# Patient Record
Sex: MALE | Race: WHITE | NOT HISPANIC OR LATINO | Employment: FULL TIME | URBAN - METROPOLITAN AREA
[De-identification: names, ages, dates, MRNs, and addresses within clinical notes are randomized per-mention and may not be internally consistent; named-entity substitution may affect disease eponyms.]

---

## 2021-08-17 ENCOUNTER — OFFICE VISIT (OUTPATIENT)
Dept: FAMILY MEDICINE CLINIC | Facility: CLINIC | Age: 26
End: 2021-08-17

## 2021-08-17 VITALS
BODY MASS INDEX: 34.3 KG/M2 | HEIGHT: 66 IN | OXYGEN SATURATION: 97 % | TEMPERATURE: 97.5 F | DIASTOLIC BLOOD PRESSURE: 73 MMHG | SYSTOLIC BLOOD PRESSURE: 115 MMHG | WEIGHT: 213.4 LBS | HEART RATE: 62 BPM

## 2021-08-17 DIAGNOSIS — F41.9 ANXIETY: ICD-10-CM

## 2021-08-17 DIAGNOSIS — G44.209 ACUTE NON INTRACTABLE TENSION-TYPE HEADACHE: ICD-10-CM

## 2021-08-17 DIAGNOSIS — R41.840 DECREASED ATTENTION SPAN: ICD-10-CM

## 2021-08-17 DIAGNOSIS — F32.1 CURRENT MODERATE EPISODE OF MAJOR DEPRESSIVE DISORDER, UNSPECIFIED WHETHER RECURRENT (HCC): ICD-10-CM

## 2021-08-17 DIAGNOSIS — Z00.00 HEALTH MAINTENANCE EXAMINATION: Primary | ICD-10-CM

## 2021-08-17 PROCEDURE — 99385 PREV VISIT NEW AGE 18-39: CPT | Performed by: NURSE PRACTITIONER

## 2021-08-17 RX ORDER — BUPROPION HYDROCHLORIDE 150 MG/1
150 TABLET ORAL DAILY
Qty: 90 TABLET | Refills: 0 | Status: SHIPPED | OUTPATIENT
Start: 2021-08-17 | End: 2021-09-21 | Stop reason: SDUPTHER

## 2021-08-17 NOTE — PROGRESS NOTES
Subjective   Herbert Bruce is a 26 y.o. male.     Chief Complaint   Patient presents with   • Annual Exam   • Headache     bottom of skull   • discuss attention disorder     This is my first time seeing this patient.  Headache   This is a recurrent problem. The pain is located in the occipital region. The pain does not radiate. The quality of the pain is described as dull. Associated symptoms include phonophobia. Pertinent negatives include no abdominal pain, coughing, dizziness, ear pain, fever, nausea, photophobia, rhinorrhea, sore throat, visual change or vomiting. Exacerbated by: caffeine, anxiety and long distance driving. He has tried nothing for the symptoms.      The patient is being seen for a health maintenance evaluation.  The last health maintenance visit was about 2 years ago.  Social history: Household members include sister and sister-in-law.  He is unmarried.  Work status: Full-time.  The patient is an occasional smoker.  He reports 1-2 alcoholic beverages per day.  General health: The patient's health is described as good.  He does not have regular dental visits.  The patient brushes daily, flosses occasionally and reports his last dental visit is unknown.  He denies vision problems. Vision care includes no need for vision correction and no recent eye examination. He denies hearing loss. Immunization status: Tdap vaccination needed.   Lifestyle: He exercises regularly.   Reproductive health: He is not sexually active.     PHQ-9 Depression Screening  Little interest or pleasure in doing things? 1   Feeling down, depressed, or hopeless? 1   Trouble falling or staying asleep, or sleeping too much? 1   Feeling tired or having little energy? 3   Poor appetite or overeating? 0   Feeling bad about yourself - or that you are a failure or have let yourself or your family down? 3   Trouble concentrating on things, such as reading the newspaper or watching television? 3   Moving or speaking so slowly that  other people could have noticed? Or the opposite - being so fidgety or restless that you have been moving around a lot more than usual? 2   Thoughts that you would be better off dead, or of hurting yourself in some way? 0   PHQ-9 Total Score 14   If you checked off any problems, how difficult have these problems made it for you to do your work, take care of things at home, or get along with other people? Somewhat difficult       The following portions of the patient's history were reviewed and updated as appropriate: allergies, current medications, past family history, past medical history, past social history, past surgical history and problem list.    Past Medical History:   Diagnosis Date   • Seizures (CMS/HCC) 2003       Past Surgical History:   Procedure Laterality Date   • BRAIN SURGERY  2010       History reviewed. No pertinent family history.    Social History     Socioeconomic History   • Marital status: Single     Spouse name: Not on file   • Number of children: Not on file   • Years of education: Not on file   • Highest education level: Not on file   Tobacco Use   • Smoking status: Never Smoker   • Smokeless tobacco: Never Used   Substance and Sexual Activity   • Alcohol use: Yes     Comment: daily   • Drug use: Never       Review of Systems   Constitutional: Negative for fever.   HENT: Negative for ear pain, rhinorrhea and sore throat.    Eyes: Negative for photophobia.   Respiratory: Negative for cough and shortness of breath.    Cardiovascular: Negative for chest pain.   Gastrointestinal: Negative for abdominal pain, diarrhea, nausea and vomiting.   Genitourinary: Negative.    Musculoskeletal: Negative.    Skin: Negative for rash.   Neurological: Positive for headache. Negative for dizziness.   Psychiatric/Behavioral: Positive for depressed mood. Negative for suicidal ideas.       Objective   Vitals:    08/17/21 1319   BP: 115/73   BP Location: Left arm   Patient Position: Sitting   Cuff Size: Adult  "  Pulse: 62   Temp: 97.5 °F (36.4 °C)   TempSrc: Temporal   SpO2: 97%   Weight: 96.8 kg (213 lb 6.4 oz)   Height: 167.6 cm (66\")      Body mass index is 34.44 kg/m².  Physical Exam  Vitals and nursing note reviewed.   Constitutional:       Appearance: Normal appearance.   HENT:      Head: Normocephalic and atraumatic.      Right Ear: Tympanic membrane and ear canal normal.      Left Ear: Tympanic membrane and ear canal normal.   Eyes:      Conjunctiva/sclera: Conjunctivae normal.      Pupils: Pupils are equal, round, and reactive to light.   Cardiovascular:      Rate and Rhythm: Normal rate and regular rhythm.      Heart sounds: Normal heart sounds.   Pulmonary:      Effort: Pulmonary effort is normal.      Breath sounds: Normal breath sounds.   Abdominal:      General: Bowel sounds are normal.      Palpations: Abdomen is soft.      Tenderness: There is no abdominal tenderness.   Genitourinary:     Comments: Declined   Musculoskeletal:         General: Normal range of motion.      Cervical back: Neck supple.   Skin:     General: Skin is warm and dry.   Neurological:      Mental Status: He is alert and oriented to person, place, and time.   Psychiatric:         Mood and Affect: Mood normal.           Assessment/Plan   Diagnoses and all orders for this visit:    1. Health maintenance examination (Primary)  -     Cancel: Tdap Vaccine Greater Than or Equal To 6yo IM    2. Acute non intractable tension-type headache  Comments:  - Advised patient to use OTC NSAID as needed.   - Decrease caffeine use.     3. Current moderate episode of major depressive disorder, unspecified whether recurrent (CMS/[facility])  Comments:  - Risks and benefits of medication discussed with patient.   - ER if any SI/HI.   Orders:  -     buPROPion XL (Wellbutrin XL) 150 MG 24 hr tablet; Take 1 tablet by mouth Daily.  Dispense: 90 tablet; Refill: 0    4. Anxiety  -     buPROPion XL (Wellbutrin XL) 150 MG 24 hr tablet; Take 1 tablet by mouth Daily.  " Dispense: 90 tablet; Refill: 0    5. Decreased attention Span  -     buPROPion XL (Wellbutrin XL) 150 MG 24 hr tablet; Take 1 tablet by mouth Daily.  Dispense: 90 tablet; Refill: 0    Impression: Currently he has an adequate exercise regimen.  Patient declines screening lab work at this time.  Tdap is unavailable in office today.  He was advised to be evaluated by dentist.  Advice and education were given regarding diet.

## 2021-09-21 ENCOUNTER — OFFICE VISIT (OUTPATIENT)
Dept: FAMILY MEDICINE CLINIC | Facility: CLINIC | Age: 26
End: 2021-09-21

## 2021-09-21 VITALS
WEIGHT: 208.4 LBS | HEART RATE: 62 BPM | OXYGEN SATURATION: 97 % | BODY MASS INDEX: 33.49 KG/M2 | SYSTOLIC BLOOD PRESSURE: 105 MMHG | TEMPERATURE: 97.5 F | HEIGHT: 66 IN | DIASTOLIC BLOOD PRESSURE: 68 MMHG

## 2021-09-21 DIAGNOSIS — R41.840 DECREASED ATTENTION SPAN: ICD-10-CM

## 2021-09-21 DIAGNOSIS — F41.9 ANXIETY: ICD-10-CM

## 2021-09-21 DIAGNOSIS — F32.1 CURRENT MODERATE EPISODE OF MAJOR DEPRESSIVE DISORDER, UNSPECIFIED WHETHER RECURRENT (HCC): ICD-10-CM

## 2021-09-21 PROCEDURE — 99213 OFFICE O/P EST LOW 20 MIN: CPT | Performed by: NURSE PRACTITIONER

## 2021-09-21 RX ORDER — BUPROPION HYDROCHLORIDE 300 MG/1
300 TABLET ORAL DAILY
Qty: 90 TABLET | Refills: 0 | Status: SHIPPED | OUTPATIENT
Start: 2021-09-21 | End: 2021-10-20 | Stop reason: SDUPTHER

## 2021-09-21 NOTE — PROGRESS NOTES
"Chief Complaint  Anxiety and ADD    Subjective          Herbert Bruce presents to [facility]  History of Present Illness  Depression: Patient states their depression has remained stable since the last visit. Interval symptoms: depressed mood, difficulty concentrating and anxiety. Social support: The patient has good social support. The patient is adherent with their medication regimen. Medication(s): bupropion.   Objective   Vital Signs:   /68 (BP Location: Left arm, Patient Position: Sitting, Cuff Size: Adult)   Pulse 62   Temp 97.5 °F (36.4 °C) (Temporal)   Ht 167.6 cm (66\")   Wt 94.5 kg (208 lb 6.4 oz)   SpO2 97%   BMI 33.64 kg/m²     Physical Exam  Vitals and nursing note reviewed.   Constitutional:       Appearance: Normal appearance.   Cardiovascular:      Rate and Rhythm: Normal rate and regular rhythm.      Heart sounds: Normal heart sounds.   Pulmonary:      Effort: Pulmonary effort is normal.      Breath sounds: Normal breath sounds.   Neurological:      Mental Status: He is alert and oriented to person, place, and time.   Psychiatric:         Mood and Affect: Mood normal.        Result Review :            Assessment and Plan    Diagnoses and all orders for this visit:    1. Current moderate episode of major depressive disorder, unspecified whether recurrent (CMS/[facility])  Comments:  - Will increase bupropion to 300 mg daily.   - ER if any SI/HI.   Orders:  -     buPROPion XL (Wellbutrin XL) 300 MG 24 hr tablet; Take 1 tablet by mouth Daily.  Dispense: 90 tablet; Refill: 0    2. Anxiety  -     buPROPion XL (Wellbutrin XL) 300 MG 24 hr tablet; Take 1 tablet by mouth Daily.  Dispense: 90 tablet; Refill: 0    3. Decreased attention Span  -     buPROPion XL (Wellbutrin XL) 300 MG 24 hr tablet; Take 1 tablet by mouth Daily.  Dispense: 90 tablet; Refill: 0      I spent 20 minutes caring for Herbert on this date of service. This time includes time spent by me in the following " activities:performing a medically appropriate examination and/or evaluation , counseling and educating the patient/family/caregiver, ordering medications, tests, or procedures and documenting information in the medical record  Follow Up   Return in about 4 weeks (around 10/19/2021) for Recheck.  Patient was given instructions and counseling regarding his condition or for health maintenance advice. Please see specific information pulled into the AVS if appropriate.

## 2021-10-20 ENCOUNTER — OFFICE VISIT (OUTPATIENT)
Dept: FAMILY MEDICINE CLINIC | Facility: CLINIC | Age: 26
End: 2021-10-20

## 2021-10-20 VITALS
SYSTOLIC BLOOD PRESSURE: 124 MMHG | HEIGHT: 66 IN | DIASTOLIC BLOOD PRESSURE: 80 MMHG | OXYGEN SATURATION: 97 % | BODY MASS INDEX: 33.14 KG/M2 | WEIGHT: 206.2 LBS | HEART RATE: 55 BPM | TEMPERATURE: 98.6 F

## 2021-10-20 DIAGNOSIS — F41.9 ANXIETY: ICD-10-CM

## 2021-10-20 DIAGNOSIS — Z23 NEED FOR INFLUENZA VACCINATION: ICD-10-CM

## 2021-10-20 DIAGNOSIS — R41.840 DECREASED ATTENTION SPAN: ICD-10-CM

## 2021-10-20 DIAGNOSIS — F32.1 CURRENT MODERATE EPISODE OF MAJOR DEPRESSIVE DISORDER, UNSPECIFIED WHETHER RECURRENT (HCC): Primary | ICD-10-CM

## 2021-10-20 PROCEDURE — 99214 OFFICE O/P EST MOD 30 MIN: CPT | Performed by: NURSE PRACTITIONER

## 2021-10-20 PROCEDURE — 90686 IIV4 VACC NO PRSV 0.5 ML IM: CPT | Performed by: NURSE PRACTITIONER

## 2021-10-20 PROCEDURE — 90471 IMMUNIZATION ADMIN: CPT | Performed by: NURSE PRACTITIONER

## 2021-10-20 RX ORDER — BUPROPION HYDROCHLORIDE 150 MG/1
150 TABLET ORAL DAILY
Qty: 90 TABLET | Refills: 0 | Status: SHIPPED | OUTPATIENT
Start: 2021-10-20

## 2021-10-20 NOTE — PROGRESS NOTES
"Chief Complaint  Anxiety    Subjective          Herbert Bruce presents to [facility]  History of Present Illness  Depression: Patient states their depression has worsened since the last visit. Interval symptoms: depressed mood and anxiety. Social support: The patient has good social support. The patient is adherent with their medication regimen. Medication(s): bupropion. Patient reports depression and anxiety has worsened since increasing bupropion.   Objective   Vital Signs:   /80 (BP Location: Right arm, Patient Position: Sitting)   Pulse 55   Temp 98.6 °F (37 °C)   Ht 167.6 cm (65.98\")   Wt 93.5 kg (206 lb 3.2 oz)   SpO2 97%   BMI 33.30 kg/m²     Physical Exam  Vitals and nursing note reviewed.   Constitutional:       Appearance: Normal appearance.   Cardiovascular:      Rate and Rhythm: Normal rate and regular rhythm.      Heart sounds: Normal heart sounds.   Pulmonary:      Effort: Pulmonary effort is normal.      Breath sounds: Normal breath sounds.   Neurological:      Mental Status: He is alert and oriented to person, place, and time.   Psychiatric:         Mood and Affect: Mood normal.        Result Review :            Assessment and Plan    Diagnoses and all orders for this visit:    1. Current moderate episode of major depressive disorder, unspecified whether recurrent ([facility]) (Primary)  Comments:  - Will decrease bupropion to 150 mg daily.   - Risks and benefits of sertraline discussed with patient.   - ER if any SI/HI.   Orders:  -     buPROPion XL (Wellbutrin XL) 150 MG 24 hr tablet; Take 1 tablet by mouth Daily.  Dispense: 90 tablet; Refill: 0  -     sertraline (Zoloft) 50 MG tablet; TAKE 1/2 TABLET DAILY X 1 WEEK, THEN TAKE 1 TABLET DAILY  Dispense: 90 tablet; Refill: 0    2. Anxiety  -     buPROPion XL (Wellbutrin XL) 150 MG 24 hr tablet; Take 1 tablet by mouth Daily.  Dispense: 90 tablet; Refill: 0  -     sertraline (Zoloft) 50 MG tablet; TAKE 1/2 TABLET " DAILY X 1 WEEK, THEN TAKE 1 TABLET DAILY  Dispense: 90 tablet; Refill: 0    3. Decreased attention Span  -     buPROPion XL (Wellbutrin XL) 150 MG 24 hr tablet; Take 1 tablet by mouth Daily.  Dispense: 90 tablet; Refill: 0    4. Need for influenza vaccination  -     FluLaval/Fluarix/Fluzone >6 Months ([id number])      I spent 20 minutes caring for Herbert on this date of service. This time includes time spent by me in the following activities:performing a medically appropriate examination and/or evaluation , counseling and educating the patient/family/caregiver, ordering medications, tests, or procedures and documenting information in the medical record  Follow Up   Return in about 6 weeks (around 12/1/2021) for Recheck.  Patient was given instructions and counseling regarding his condition or for health maintenance advice. Please see specific information pulled into the AVS if appropriate.

## 2021-11-08 ENCOUNTER — TELEPHONE (OUTPATIENT)
Dept: FAMILY MEDICINE CLINIC | Facility: CLINIC | Age: 26
End: 2021-11-08

## 2021-11-08 NOTE — TELEPHONE ENCOUNTER
Caller: Herbert Bruce    Relationship: Self    Best call back number: [phone]    What medications are you currently taking:   Current Outpatient Medications on File Prior to Visit   Medication Sig Dispense Refill   • buPROPion XL (Wellbutrin XL) 150 MG 24 hr tablet Take 1 tablet by mouth Daily. 90 tablet 0   • sertraline (Zoloft) 50 MG tablet TAKE 1/2 TABLET DAILY X 1 WEEK, THEN TAKE 1 TABLET DAILY 90 tablet 0     No current facility-administered medications on file prior to visit.          When did you start taking these medications: 2 WEEKS      Which medication are you concerned about: ZOLOFT     Who prescribed you this medication: GIRMA LEE     What are your concerns: ISSUES WITH PERFORMING SEXUAL ACTIVITIES     How long have you had these concerns: A COUPLE DAYS AFTER BEGINNING MEDICATION

## 2021-11-10 ENCOUNTER — TELEMEDICINE (OUTPATIENT)
Dept: FAMILY MEDICINE CLINIC | Facility: CLINIC | Age: 26
End: 2021-11-10

## 2021-11-10 DIAGNOSIS — F32.1 CURRENT MODERATE EPISODE OF MAJOR DEPRESSIVE DISORDER, UNSPECIFIED WHETHER RECURRENT (HCC): Primary | ICD-10-CM

## 2021-11-10 PROCEDURE — 99212 OFFICE O/P EST SF 10 MIN: CPT | Performed by: NURSE PRACTITIONER

## 2021-11-10 RX ORDER — ESCITALOPRAM OXALATE 10 MG/1
10 TABLET ORAL DAILY
Qty: 30 TABLET | Refills: 1 | Status: SHIPPED | OUTPATIENT
Start: 2021-11-10

## 2021-11-10 NOTE — PROGRESS NOTES
Chief Complaint  Depression  You have chosen to receive care through a telehealth visit.  Do you consent to use a video/audio connection for your medical care today? Yes via [employer]   Subjective          Herbert Bruce presents to [facility]  History of Present Illness  Depression: Patient states their depression has remained stable since the last visit. Social support: The patient has good social support. The patient is adherent with their medication regimen. Medication(s): bupropion and Zoloft. Patient complains of sexual side effects with Zoloft.   Objective   Vital Signs:   There were no vitals taken for this visit.    Physical Exam  Constitutional:       Appearance: Normal appearance.   Neurological:      Mental Status: He is alert.   Psychiatric:         Mood and Affect: Mood normal.        Result Review :            Assessment and Plan    Diagnoses and all orders for this visit:    1. Current moderate episode of major depressive disorder, unspecified whether recurrent (HCC) (Primary)  Comments:  - Risks and benefits of medication discussed with patient.   - ER if any SI/HI.   Orders:  -     escitalopram (Lexapro) 10 MG tablet; Take 1 tablet by mouth Daily.  Dispense: 30 tablet; Refill: 1    Zoloft taper:  - take 1/2 tablet every other day x 1 week then  - discontinue Zoloft and start escitalopram    I spent 15 minutes caring for Herbert on this date of service. This time includes time spent by me in the following activities:counseling and educating the patient/family/caregiver, ordering medications, tests, or procedures and documenting information in the medical record  Follow Up   Return in about 6 weeks (around 12/22/2021) for Recheck.  Patient was given instructions and counseling regarding his condition or for health maintenance advice. Please see specific information pulled into the AVS if appropriate.

## 2025-02-12 ENCOUNTER — TELEMEDICINE (OUTPATIENT)
Dept: FAMILY MEDICINE CLINIC | Facility: TELEHEALTH | Age: 30
End: 2025-02-12
Payer: COMMERCIAL

## 2025-02-12 VITALS — BODY MASS INDEX: 40.18 KG/M2 | WEIGHT: 250 LBS | HEIGHT: 66 IN

## 2025-02-12 DIAGNOSIS — U07.1 COVID-19: Primary | ICD-10-CM

## 2025-02-12 NOTE — PATIENT INSTRUCTIONS
Drink plenty of water  Over the counter pain relievers okay   If symptoms do not improve in 3-5 days follow up with your primary care provider or urgent care  If symptoms worsen follow up with urgent care or the emergency room      Diagnosis  COVID-19 infection  Most people with COVID-19 have mild to moderate symptoms and can rest at home until they get better.   Stay home  While you're recovering, STAY HOME for at least 5 full days. Don't leave your home except to get medical care.  While at home, avoid close contact with others.  If possible, stay in a room away from other people in your home, and use a separate bathroom.  Wear a well-fitting face mask when you can't avoid contact with other people.  If you can't wear a face mask because of breathing difficulty, your caregiver should wear a face mask.  Wearing a face mask does NOT mean you can leave your home. You must continue to stay home for at least 5 full days.  You can return to your normal activities when ALL of the following are true:  You've been fever-free for at least 24 hours (1 full day) without using fever-reducing medications such as Tylenol  Your symptoms have improved  It's been at least 5 full days since your symptoms first started  You should continue to wear a mask around others until it's been at least 10 days since your symptoms first started.  Prevention  Cover your mouth and nose with a tissue when you cough or sneeze. Throw used tissues in a lined trash can right away, and wash your hands immediately after.  Avoid sharing personal items like dishes, utensils, towels, or bedding. Wash items thoroughly with soap and water after use.  Wash your hands often with soap and water for at least 20 seconds. If soap and water are not available, clean your hands with a hand  that contains at least 60% alcohol. Cover all surfaces of your hands and rub them together until they feel dry.  Avoid touching your face, especially your eyes, nose, and  "mouth.  Clean \"high-touch\" surfaces daily. \"High-touch\" surfaces include counters, tabletops, doorknobs, bathroom fixtures, toilets, phones, keyboards, tablets, and bedside tables. You can use soap, detergents, 60%-80% ethanol or isopropyl alcohol,  such as Windex, or bleach. All of these  are effective at killing the virus that causes COVID-19.  Limit contact with pets and other animals while sick. If you must care for your pet, wash your hands before and after you interact with them and wear a face mask.  What to expect  Follow the advice in the treatment section below and you should feel better within 7 to 14 days. You may continue to feel tired and have a cough for several weeks.  Prescription  Paxlovid is a treatment with 2 medications (nirmatrelvir 150mg/ritonavir 100mg): Take 2 tablets of nirmatrelvir and 1 tablet of ritonavir by mouth twice a day (in the morning and evening) for 5 days. Take all 3 tablets at the same time, with or without food. Do not cut, crush, or chew the tablets. Take exactly as directed. You must finish the entire course of medication, even if you feel better after the first few days of treatment.  I've given you a prescription for Paxlovid. Start taking your prescription as soon as possible. It works best when started within 5 days of getting sick.  If you miss a dose within 8 hours of the time it should be taken, take it as soon as possible and continue with your normal dosing schedule. If you miss a dose by more than 8 hours, don't take the missed dose and don't double the next dose to make up for the missed dose. Instead, take the normal dose at your next regularly scheduled time.  It's rare, but some people have \"rebound\" symptoms after finishing treatment with Paxlovid. This means they felt better and tested negative for COVID-19 after taking Paxlovid, but their symptoms returned, and they tested positive again several days later.  Usually, rebound symptoms " are mild and people recover without needing additional treatment.  If you have rebound symptoms:  Re-isolate for at least 5 days. You can end your re-isolation after 5 days if you've been fever free for 24 hours (without the use of fever-reducing medications) and your symptoms are improving.  Wear a mask for another 10 days when you're around others.  You don't need to take another course of Paxlovid.  If your rebound symptoms are severe, persist, or worsen, call your healthcare provider right away.  For more information on Paxlovid, including side effects such as altered sense of taste, visit the [place] Food and Drug Administration's (FDA) Paxlovid Fact Sheet for Patients, Parents, and Caregivers:  www.fda.gov/media/988670/download    About your diagnosis  Common symptoms of COVID-19 include fever, cough, shortness of breath, fatigue, muscle or body aches, headaches, new loss of sense of taste or smell, sore throat, stuffy or runny nose, nausea or vomiting, and diarrhea. Most people who get COVID-19 have mild symptoms and can rest at home until they get better. Elderly people and those with chronic medical problems may be at risk for more serious complications.    Call your healthcare provider immediately if you have any of the following:  Fever over 103F  Fever that doesn't come down when you take Tylenol or ibuprofen  Fever that returns after being gone for more than 24 hours  Fever for more than 4 days  Worsening shortness of breath or difficulty breathing  Go to your nearest ER or call 911 if you have any of the following:  Shortness of breath that makes it difficult to do simple things like get dressed, bathe, or comb your hair  Persistent chest pain or chest tightness  New confusion or difficulty staying alert  Bluish color to the lips or face  Other treatment  Rest and drink plenty of sugar-free fluids.  Gargle with salt water several times a day to help your throat feel better. Cough drops and throat lozenges  may provide extra relief. A teaspoon of honey stirred into warm water or weak tea can help soothe a sore throat and cough.  If your nose or sinuses become very stuffy, try using a Neti Pot to flush them out. Neti Pots are available at any drugstore without a prescription.  Avoid smoke and air pollution. Smoke can make infections worse.     Nirmatrelvir; Ritonavir Tablets  What is this medication?  NIRMATRELVIR; RITONAVIR (EDIE ma TREL vir; ri TOE na veer) treats mild to moderate COVID-19. It may help people who are at high risk of developing severe illness. It works by limiting the spread of the virus in your body.  This medicine may be used for other purposes; ask your health care provider or pharmacist if you have questions.  COMMON BRAND NAME(S): PAXLOVID  What should I tell my care team before I take this medication?  They need to know if you have any of these conditions:  Any allergies  Any serious illness  Kidney disease  Liver disease  An unusual or allergic reaction to nirmatrelvir, ritonavir, other medications, foods, dyes, or preservatives  Pregnant or trying to get pregnant  Breast-feeding  How should I use this medication?  This product contains 2 different medications that are packaged together. For the standard dose, take 2 pink tablets of nirmatrelvir with 1 white tablet of ritonavir (3 tablets total) by mouth with water twice daily. Talk to your care team if you have kidney disease. You may need a different dose. Swallow the tablets whole. You can take it with or without food. If it upsets your stomach, take it with food. Take all of this medication unless your care team tells you to stop it early. Keep taking it even if you think you are better.  Talk to your care team about the use of this medication in children. While it may be prescribed for children as young as 12 years for selected conditions, precautions do apply.  Overdosage: If you think you have taken too much of this medicine contact a  poison control center or emergency room at once.  NOTE: This medicine is only for you. Do not share this medicine with others.  What if I miss a dose?  If you miss a dose, take it as soon as you can unless it is more than 8 hours late. If it is more than 8 hours late, skip the missed dose. Take the next dose at the normal time. Do not take extra or 2 doses at the same time to make up for the missed dose.  What may interact with this medication?  Do not take this medication with any of the following:  Alfuzosin  Certain medications for anxiety or sleep, such as midazolam or triazolam  Certain medications for cancer, such as apalutamide  Certain medications for cholesterol, such as lovastatin or simvastatin  Certain medications for irregular heartbeat, such as amiodarone, dronedarone, flecainide, propafenone, quinidine  Certain medications for mental health conditions, such as lurasidone or pimozide  Certain medications for seizures, such as carbamazepine, phenobarbital, phenytoin, primidone  Colchicine  Eletriptan  Eplerenone  Ergot alkaloids, such as dihydroergotamine, ergotamine, methylergonovine  Finerenone  Flibanserin  Ivabradine  Lomitapide  Lumacaftor; ivacaftor  Naloxegol  Ranolazine  Red Yeast Rice  Rifampin  Rifapentine  Sildenafil  Silodosin  Nhan's wort  Tolvaptan  Ubrogepant  Voclosporin  This medication may affect how other medications work, and other medications may affect the way this medication works. Talk with your care team about all of the medications you take. They may suggest changes to your treatment plan to lower the risk of side effects and to make sure your medications work as intended.  This list may not describe all possible interactions. Give your health care provider a list of all the medicines, herbs, non-prescription drugs, or dietary supplements you use. Also tell them if you smoke, drink alcohol, or use illegal drugs. Some items may interact with your medicine.  What should I  watch for while using this medication?  Your condition will be monitored carefully while you are receiving this medication. Visit your care team for regular checkups. Tell your care team if your symptoms do not start to get better or if they get worse.  If you have untreated HIV infection, this medication may lead to some HIV medications not working as well in the future.  Estrogen and progestin hormones may not work as well while you are taking this medication. Your care team can help you find the contraceptive option that works for you.  What side effects may I notice from receiving this medication?  Side effects that you should report to your care team as soon as possible:  Allergic reactions--skin rash, itching, hives, swelling of the face, lips, tongue, or throat  Liver injury--right upper belly pain, loss of appetite, nausea, light-colored stool, dark yellow or brown urine, yellowing skin or eyes, unusual weakness or fatigue  Redness, blistering, peeling, or loosening of the skin, including inside the mouth  Side effects that usually do not require medical attention (report these to your care team if they continue or are bothersome):  Change in taste  Diarrhea  General discomfort and fatigue  Increase in blood pressure  Muscle pain  Nausea  Stomach pain  This list may not describe all possible side effects. Call your doctor for medical advice about side effects. You may report side effects to FDA at 3-890-FDA-7747.  Where should I keep my medication?  Keep out of the reach of children and pets.  Store at room temperature between 20 and 25 degrees C (68 and 77 degrees F). Get rid of any unused medication after the expiration date.  To get rid of medications that are no longer needed or have :  Take the medication to a medication take-back program. Check with your pharmacy or law enforcement to find a location.  If you cannot return the medication, check the label or package insert to see if the  medication should be thrown out in the garbage or flushed down the toilet. If you are not sure, ask your care team. If it is safe to put it in the trash, take the medication out of the container. Mix the medication with cat litter, dirt, coffee grounds, or other unwanted substance. Seal the mixture in a bag or container. Put it in the trash.  NOTE: This sheet is a summary. It may not cover all possible information. If you have questions about this medicine, talk to your doctor, pharmacist, or health care provider.  © 2024 [employer] (2024-11-29 00:00:00)

## 2025-02-12 NOTE — LETTER
February 12, 2025     Patient: Herbert Bruce   YOB: 1995   Date of Visit: 2/12/2025       To Whom It May Concern:    It is my medical opinion that Herbert Bruce may return to work Monday 2/17/2025.       Sincerely,    GIRMA Eastman     URGENT CARE VIDEO VISIT PROVIDER    CC: No Recipients

## 2025-02-12 NOTE — PROGRESS NOTES
"CHIEF COMPLAINT  Chief Complaint   Patient presents with    Sore Throat    Cough    Generalized Body Aches         HPI  Herbert Bruce is a 29 y.o. male  presents with complaint of having symptoms of COVID-19 yesterday. He tested positive for COVID-19 today.   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   He has taken Paxlovid before and has done well with this.   No history of kidney or liver disease.       Review of Systems   Constitutional:  Positive for chills, diaphoresis, fatigue and fever.   HENT:  Positive for congestion, postnasal drip, rhinorrhea and sore throat.    Respiratory:  Positive for cough. Negative for chest tightness, shortness of breath and wheezing.    Cardiovascular:  Negative for chest pain.   Gastrointestinal:  Negative for diarrhea, nausea and vomiting.   Musculoskeletal:  Positive for myalgias.   Neurological:  Positive for headaches.       Past Medical History:   Diagnosis Date    Seizures 2003       No family history on file.    Social History     Socioeconomic History    Marital status: Single   Tobacco Use    Smoking status: Never    Smokeless tobacco: Never   Substance and Sexual Activity    Alcohol use: Yes     Comment: daily    Drug use: Never         Ht 167.6 cm (66\")   Wt 113 kg (250 lb)   BMI 40.35 kg/m²     PHYSICAL EXAM  Physical Exam   Constitutional: He is oriented to person, place, and time. He appears well-developed and well-nourished. He does not have a sickly appearance. He does not appear ill. No distress.   HENT:   Head: Normocephalic and atraumatic.   Eyes: EOM are normal.   Neck: Neck normal appearance.  Pulmonary/Chest: Effort normal.  No respiratory distress.  Neurological: He is alert and oriented to person, place, and time.   Skin: Skin is dry.   Psychiatric: He has a normal mood and affect.       No results found for this or any previous visit.    Diagnoses and all orders for this visit:    1. COVID-19 (Primary)    Other orders  -     Nirmatrelvir & " Ritonavir, 300mg/100mg, (PAXLOVID); Take 3 tablets by mouth 2 (Two) Times a Day for 5 days.  Dispense: 30 tablet; Refill: 0    BMI 40.3 kg/m2    Mode of visit: Video   Myself and Herbert Bruce participated in this visit. The patient is located in [address] [place] [address]. [id number] [place] [id number]. I am located in [place], [place]. Mychart and Twilio were utilized.   You have chosen to receive care through a telehealth visit.     Does the patient consent to use a video/audio connection for your medical care today? Yes       Note Disclaimer: At [facility], we believe that sharing information builds trust and better   relationships. You are receiving this note because you recently visited [facility]. It is possible you   will see health information before a provider has talked with you about it. This kind of information can   be easy to misunderstand. To help you fully understand what it means for your health, we urge you to   discuss this note with your provider.    Monika Murray, GIRMA  02/12/2025  12:07 EST